# Patient Record
Sex: FEMALE | Race: WHITE | Employment: FULL TIME | URBAN - METROPOLITAN AREA
[De-identification: names, ages, dates, MRNs, and addresses within clinical notes are randomized per-mention and may not be internally consistent; named-entity substitution may affect disease eponyms.]

---
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
## 2020-09-11 ENCOUNTER — OFFICE VISIT (OUTPATIENT)
Dept: INTEGRATIVE MEDICINE | Facility: CLINIC | Age: 20
End: 2020-09-11
Payer: COMMERCIAL

## 2020-09-11 VITALS
SYSTOLIC BLOOD PRESSURE: 116 MMHG | HEART RATE: 74 BPM | DIASTOLIC BLOOD PRESSURE: 78 MMHG | WEIGHT: 216 LBS | HEIGHT: 63 IN | OXYGEN SATURATION: 96 % | BODY MASS INDEX: 38.27 KG/M2

## 2020-09-11 DIAGNOSIS — Z00.00 ROUTINE PHYSICAL EXAMINATION: Primary | ICD-10-CM

## 2020-09-11 DIAGNOSIS — F10.10 ALCOHOL ABUSE: ICD-10-CM

## 2020-09-11 DIAGNOSIS — K21.9 GASTROESOPHAGEAL REFLUX DISEASE, ESOPHAGITIS PRESENCE NOT SPECIFIED: ICD-10-CM

## 2020-09-11 DIAGNOSIS — E55.9 VITAMIN D DEFICIENCY: ICD-10-CM

## 2020-09-11 PROCEDURE — 3074F SYST BP LT 130 MM HG: CPT | Performed by: PHYSICIAN ASSISTANT

## 2020-09-11 PROCEDURE — 99385 PREV VISIT NEW AGE 18-39: CPT | Performed by: PHYSICIAN ASSISTANT

## 2020-09-11 PROCEDURE — 3008F BODY MASS INDEX DOCD: CPT | Performed by: PHYSICIAN ASSISTANT

## 2020-09-11 PROCEDURE — 3078F DIAST BP <80 MM HG: CPT | Performed by: PHYSICIAN ASSISTANT

## 2020-09-11 NOTE — PATIENT INSTRUCTIONS
Omega 3 fatty acids are anti-inflammatory and important for brain and nervous system health, including memory. I recommend taking 2000 mg daily.   Some good brands are:  - at [employer] stores: [employer], [employer] blend (vegan), [employer]

## 2020-09-11 NOTE — PROGRESS NOTES
Giovanna Lang is a 21year old female. Patient presents with:  Physical: np physical       HPI:   Doing outpatient alcohol therapy. Has been alcohol free for 30 days. On SSRI for treatment. Eating fast food 3x a week. Eats a lot of carbs.  Drinking more w Single      Spouse name: Not on file      Number of children: Not on file      Years of education: Not on file      Highest education level: Not on file    Occupational History      Not on file    Social Needs      Financial resource strain: Not on file oropharyngeal exudate. Eyes: Pupils are equal, round, and reactive to light. Conjunctivae are normal. Right eye exhibits no discharge. Left eye exhibits no discharge. Neck: Normal range of motion. Neck supple. No thyromegaly present.    Cardiovascular: CBC WITH DIFFERENTIAL WITH PLATELET; Future  - COMP METABOLIC PANEL (14); Future  - FERRITIN; Future  - FOLIC ACID SERUM(FOLATE);  Future  - VITAMIN D, 25-HYDROXY; Future  - VITAMIN B12; Future  - ASSAY, THYROID STIM HORMONE; Future  - FREE T3 (TRIIODOTHYRO dose every 7 days until taking 1.5 tsp twice daily. Mix in liquid of choice. Reduce to lowest dose tolerated if any reactions occur. Buy it online at [employer] or at the [employer]. No follow-ups on file.   Patient affirmed understanding